# Patient Record
Sex: MALE | Race: BLACK OR AFRICAN AMERICAN | NOT HISPANIC OR LATINO | Employment: OTHER | URBAN - METROPOLITAN AREA
[De-identification: names, ages, dates, MRNs, and addresses within clinical notes are randomized per-mention and may not be internally consistent; named-entity substitution may affect disease eponyms.]

---

## 2019-06-05 ENCOUNTER — LAB VISIT (OUTPATIENT)
Dept: LAB | Facility: HOSPITAL | Age: 74
End: 2019-06-05
Payer: MEDICARE

## 2019-06-05 ENCOUNTER — OFFICE VISIT (OUTPATIENT)
Dept: NEUROLOGY | Facility: CLINIC | Age: 74
End: 2019-06-05
Payer: MEDICARE

## 2019-06-05 VITALS
SYSTOLIC BLOOD PRESSURE: 123 MMHG | WEIGHT: 180.31 LBS | BODY MASS INDEX: 28.98 KG/M2 | HEART RATE: 62 BPM | DIASTOLIC BLOOD PRESSURE: 72 MMHG | HEIGHT: 66 IN

## 2019-06-05 DIAGNOSIS — R20.2 PARESTHESIA: ICD-10-CM

## 2019-06-05 DIAGNOSIS — M54.2 NECK PAIN: ICD-10-CM

## 2019-06-05 PROBLEM — R20.0 NUMBNESS: Status: ACTIVE | Noted: 2019-06-05

## 2019-06-05 PROBLEM — R20.0 NUMBNESS: Status: RESOLVED | Noted: 2019-06-05 | Resolved: 2019-06-05

## 2019-06-05 LAB
CREAT SERPL-MCNC: 1 MG/DL (ref 0.5–1.4)
EST. GFR  (AFRICAN AMERICAN): >60 ML/MIN/1.73 M^2
EST. GFR  (NON AFRICAN AMERICAN): >60 ML/MIN/1.73 M^2

## 2019-06-05 PROCEDURE — 99204 OFFICE O/P NEW MOD 45 MIN: CPT | Mod: S$PBB,GC,, | Performed by: STUDENT IN AN ORGANIZED HEALTH CARE EDUCATION/TRAINING PROGRAM

## 2019-06-05 PROCEDURE — 36415 COLL VENOUS BLD VENIPUNCTURE: CPT

## 2019-06-05 PROCEDURE — 99203 OFFICE O/P NEW LOW 30 MIN: CPT | Mod: PBBFAC | Performed by: STUDENT IN AN ORGANIZED HEALTH CARE EDUCATION/TRAINING PROGRAM

## 2019-06-05 PROCEDURE — 99999 PR PBB SHADOW E&M-NEW PATIENT-LVL III: CPT | Mod: PBBFAC,GC,, | Performed by: STUDENT IN AN ORGANIZED HEALTH CARE EDUCATION/TRAINING PROGRAM

## 2019-06-05 PROCEDURE — 99999 PR PBB SHADOW E&M-NEW PATIENT-LVL III: ICD-10-PCS | Mod: PBBFAC,GC,, | Performed by: STUDENT IN AN ORGANIZED HEALTH CARE EDUCATION/TRAINING PROGRAM

## 2019-06-05 PROCEDURE — 82565 ASSAY OF CREATININE: CPT

## 2019-06-05 PROCEDURE — 99204 PR OFFICE/OUTPT VISIT, NEW, LEVL IV, 45-59 MIN: ICD-10-PCS | Mod: S$PBB,GC,, | Performed by: STUDENT IN AN ORGANIZED HEALTH CARE EDUCATION/TRAINING PROGRAM

## 2019-06-05 RX ORDER — AMLODIPINE BESYLATE 5 MG/1
5 TABLET ORAL
COMMUNITY
End: 2019-07-15 | Stop reason: DRUGHIGH

## 2019-06-05 RX ORDER — TADALAFIL 20 MG/1
20 TABLET ORAL
COMMUNITY

## 2019-06-05 NOTE — ASSESSMENT & PLAN NOTE
No complaint of neck pain, however tenderness to palpation is present on exam  Likely due to DJD in cervical spine and could be the etiology to current symptoms.  Please see below for details

## 2019-06-05 NOTE — ASSESSMENT & PLAN NOTE
Symptoms of hypoesthesia/paresthesia is restricted to the C4-C5 dermatome on exam. No rash noted, however rash related to the shingles can appear after the sensory symptoms. DJD in cervical spine and radiculopathy is the most likely explanation for his symptoms.     - advised on monitoring the skin for rash and if positive alarm his PCP  - will obtain imaging of cervical spine to rule out compression root fibers  - explained that will call him in case of abnormal results  - if no abnormality on MRI, will follow up in clinic in 3 months

## 2019-06-05 NOTE — PROGRESS NOTES
Neurology Clinic  Initial Consult Visit    Patient Name: Wellington Reyna  MRN: [id number]    CC: numbness    HPI: Wellington Reyna is a 73 y.o.  male with no significant past medical Hx, presenting to the clinic today for evaluation of pain and numbness in the right shoulder.      Patient reports new-onset pain over the R shoulder x2 weeks assoiated with numbness. He denies any trauma, inciting events, rash or similar symptoms in the past. He does not report weakness in the shoulder or R arm. Pain is deep, not sharp, has been stable, comes and goes, and is worse at nights. Numbness however, has been persistent. Patient is originally from [place] and used to work as a . He denies smoking or recreational drugs. He used to drink 1-2 glasses of wine and sometimes scotch, however quit drinking alcohol 3 months ago, because he was told by his PCP that one of his lab results was abnormal. He does not take any medications except an occasional multivitamin      Review of Systems:  General: fevers -, chills -, fatigue -, change in appetite -  Eyes: blurred vision -, double vision -, photosensitivity -  ENT: hearing loss -, difficulty swallowing -, drooling -, change in voice -  Respiratory: SOB -, cough -,  choking -  CV: chest pain -, palpitations -  GI: nausea -, vomiting -, abdominal pain -  Urinary: dysuria -, hematuria -, frequency -, urine incontinence  Skin: rash -, change of color -  Neurological: Headache -, dizziness -, weakness -, numbness +, seizure -, gait problem -, difficulty in speech -  Psychiatric: hallucinations -, confusion -, dysphoric mood -, anxiety -      Past Medical History  History reviewed. No pertinent past medical history.    Medications    Current Outpatient Medications:     amLODIPine (NORVASC) 5 MG tablet, 5 mg., Disp: , Rfl:     tadalafil (CIALIS) 20 MG Tab, 20 mg., Disp: , Rfl:   Any other notable medications as documented in HPI    Allergies  Review of patient's  "allergies indicates:  No Known Allergies    Social History  Social History     Socioeconomic History    Marital status:      Spouse name: Not on file    Number of children: Not on file    Years of education: Not on file    Highest education level: Not on file   Occupational History    Not on file   Social Needs    Financial resource strain: Not on file    Food insecurity:     Worry: Not on file     Inability: Not on file    Transportation needs:     Medical: Not on file     Non-medical: Not on file   Tobacco Use    Smoking status: Never Smoker    Smokeless tobacco: Never Used   Substance and Sexual Activity    Alcohol use: Not on file    Drug use: Not on file    Sexual activity: Not on file   Lifestyle    Physical activity:     Days per week: Not on file     Minutes per session: Not on file    Stress: Not on file   Relationships    Social connections:     Talks on phone: Not on file     Gets together: Not on file     Attends Zoroastrian service: Not on file     Active member of club or organization: Not on file     Attends meetings of clubs or organizations: Not on file     Relationship status: Not on file   Other Topics Concern    Not on file   Social History Narrative    Not on file     Any other notable Social History as documented in HPI.    Family History  History reviewed. No pertinent family history.  Any other notable FMH as documented in HPI.    Physical Exam  /72   Pulse 62   Ht 5' 6" (1.676 m)   Wt 81.8 kg (180 lb 5.4 oz)   BMI 29.11 kg/m²     General: Well-developed, well-groomed. No apparent distress  HENT: Normocephalic, atraumatic. No carotid bruits auscultated.   Cardiovascular: Regular rate and rhythm with no murmurs, rubs or gallops.    Chest: Lungs clear to auscultation bilaterally.  No wheezes, stridor, ronchi appreciated.  Abdomen: Normoactive bowel sounds present.  Soft, nontender to palpation.  Musculoskeletal: No peripheral edema, No joint swelling  Skin: no " rash within the associated dermatome    Neurologic Exam:   Mental status and Cognition:  Awake, alert and oriented x3  Follows commands, answers questions appropriately  Short term memory intact  Praxis normal  Speech Normal. Language is fluent.    Fund of knowledge is appropriate.     Cranial nerves:   PERRLA. EOM intact. No Nystagmus. No ophthalmoplegia.   Visual fields are full to confrontation.    Facial sensation is normal to light touch.   Facial expression is full and symmetric.   Hearing is intact bilaterally.   Palate elevates symmetrically.   SCM and Trapezius full strength bilaterally.   Tongue is midline.     Motor examination   normal bulk and tone in all four limbs. There are no atrophy or fasciculations.   Strength is 5/5 in the upper and lower extremities bilaterally.    Sensory examination  Sensation to light touch: intact in BUE and BLE, except on R C4-C5 dermatome (over the neck shoulder junction posteriorly)  Sensation to temperature: intact in BUE and BLE  Sensation to vibration: slightly decreased on toes  Sensation to pinprick: intact in BUE and BLE, except hypersensitive over the associated dermatome    Deep tendon reflexes   1+ and symmetric in the upper and lower extremities bilaterally.    No clonus. Babinski normal bilaterally.    Gait and Coordination:  Normal gait.  Normal tandem walking.  Finger to nose and heel to shin testing is normal bilaterally.      Lab and Test Results    No results found for: WBC, HGB, HCT, PLT  Creatinine   Date Value Ref Range Status   06/05/2019 1.0 0.5 - 1.4 mg/dL Final     No results found for: ALB, ALKPHOS, ALT, AST      Images:  none      Assessment and Plan    Problem List Items Addressed This Visit        Orthopedic    Neck pain    Current Assessment & Plan     No complaint of neck pain, however tenderness to palpation is present on exam  Likely due to DJD in cervical spine and could be the etiology to current symptoms.  Please see below for  details           Relevant Orders    MRI Cervical Spine W WO Cont    Creatinine, serum (Completed)       Other    Paresthesia    Overview     2 weeks of pain in right shoulder with sensory loss         Current Assessment & Plan     Symptoms of hypoesthesia/paresthesia is restricted to the C4-C5 dermatome on exam. No rash noted, however rash related to the shingles can appear after the sensory symptoms. DJD in cervical spine and radiculopathy is the most likely explanation for his symptoms.     - advised on monitoring the skin for rash and if positive alarm his PCP  - will obtain imaging of cervical spine to rule out compression root fibers  - explained that will call him in case of abnormal results  - if no abnormality on MRI, will follow up in clinic in 3 months         Relevant Orders    MRI Cervical Spine W WO Cont              Evelina Izaguirre MD  PGY-II, Neurology Resident  [facility]  [address] [place] [address]

## 2019-06-05 NOTE — PROGRESS NOTES
See resident note.  We saw the patient together, I examined individually, and we discussed the assessment and plan as she documented in her note.

## 2019-06-07 ENCOUNTER — TELEPHONE (OUTPATIENT)
Dept: NEUROLOGY | Facility: CLINIC | Age: 74
End: 2019-06-07

## 2019-06-07 NOTE — TELEPHONE ENCOUNTER
----- Message from Sohail Eddy sent at 6/7/2019 12:29 PM CDT -----  Rx Refill/Request     Is this a Refill or New Rx: New   Rx Name and Strength: pt is asking for medication due to experiencing shoulder pain  Preferred Pharmacy with phone number: see below  Communication Preference: [phone]  Additional Information:     [id number] - [place], [place] - [address]  [address] [place] [id number]  Phone: [phone] Fax: [phone]

## 2019-06-26 ENCOUNTER — HOSPITAL ENCOUNTER (OUTPATIENT)
Dept: RADIOLOGY | Facility: HOSPITAL | Age: 74
Discharge: HOME | End: 2019-06-26
Attending: STUDENT IN AN ORGANIZED HEALTH CARE EDUCATION/TRAINING PROGRAM
Payer: MEDICARE

## 2019-06-26 DIAGNOSIS — M54.2 NECK PAIN: ICD-10-CM

## 2019-06-26 DIAGNOSIS — R20.2 PARESTHESIA: ICD-10-CM

## 2019-06-26 PROCEDURE — 72141 MRI CERVICAL SPINE WITHOUT CONTRAST: ICD-10-PCS | Mod: 26,,, | Performed by: RADIOLOGY

## 2019-06-26 PROCEDURE — 72141 MRI NECK SPINE W/O DYE: CPT | Mod: 26,,, | Performed by: RADIOLOGY

## 2019-06-26 PROCEDURE — 72141 MRI NECK SPINE W/O DYE: CPT | Mod: TC

## 2019-07-15 ENCOUNTER — OFFICE VISIT (OUTPATIENT)
Dept: NEUROLOGY | Facility: CLINIC | Age: 74
End: 2019-07-15
Payer: MEDICARE

## 2019-07-15 VITALS — HEIGHT: 66 IN | BODY MASS INDEX: 28.56 KG/M2 | WEIGHT: 177.69 LBS

## 2019-07-15 DIAGNOSIS — M54.2 NECK PAIN: Primary | ICD-10-CM

## 2019-07-15 DIAGNOSIS — R20.2 PARESTHESIA: ICD-10-CM

## 2019-07-15 DIAGNOSIS — R29.898 WEAKNESS OF RIGHT UPPER EXTREMITY: ICD-10-CM

## 2019-07-15 DIAGNOSIS — M49.82 SPONDYLOPATHY IN DISEASES CLASSIFIED ELSEWHERE, CERVICAL REGION: ICD-10-CM

## 2019-07-15 DIAGNOSIS — M65.331 TRIGGER MIDDLE FINGER OF RIGHT HAND: ICD-10-CM

## 2019-07-15 DIAGNOSIS — M48.02 CERVICAL STENOSIS OF SPINAL CANAL: ICD-10-CM

## 2019-07-15 PROCEDURE — 99215 PR OFFICE/OUTPT VISIT, EST, LEVL V, 40-54 MIN: ICD-10-PCS | Mod: S$PBB,GC,, | Performed by: STUDENT IN AN ORGANIZED HEALTH CARE EDUCATION/TRAINING PROGRAM

## 2019-07-15 PROCEDURE — 99215 OFFICE O/P EST HI 40 MIN: CPT | Mod: S$PBB,GC,, | Performed by: STUDENT IN AN ORGANIZED HEALTH CARE EDUCATION/TRAINING PROGRAM

## 2019-07-15 PROCEDURE — 99999 PR PBB SHADOW E&M-EST. PATIENT-LVL III: CPT | Mod: PBBFAC,GC,, | Performed by: STUDENT IN AN ORGANIZED HEALTH CARE EDUCATION/TRAINING PROGRAM

## 2019-07-15 PROCEDURE — 99999 PR PBB SHADOW E&M-EST. PATIENT-LVL III: ICD-10-PCS | Mod: PBBFAC,GC,, | Performed by: STUDENT IN AN ORGANIZED HEALTH CARE EDUCATION/TRAINING PROGRAM

## 2019-07-15 PROCEDURE — 99213 OFFICE O/P EST LOW 20 MIN: CPT | Mod: PBBFAC | Performed by: STUDENT IN AN ORGANIZED HEALTH CARE EDUCATION/TRAINING PROGRAM

## 2019-07-15 RX ORDER — AMLODIPINE BESYLATE 2.5 MG/1
2.5 TABLET ORAL DAILY
Refills: 6 | COMMUNITY
Start: 2019-07-10

## 2019-07-15 NOTE — PROGRESS NOTES
Neurology Clinic  Follow up Visit    Patient Name: Wellington Reyna  MRN: [id number]    CC: numbness    HPI: Wellington Reyna is a 73 y.o. RH male with no significant past medical Hx, presenting to the clinic today for follow up on pain and numbness in the right shoulder.    Interval Hx:  Patient continues to complain of numbness over the R should/neck area. Pain has subsided but he has noted some weakness in R arm. MRI C-spine that was obtained after his first visit was significant for multilevel DJD, canal stenosis as well as foraminal narrowing.   Patient also c/o of locking the right middle finger upon opening the fist.      Initial encounter (6/5/19):  Patient reports new-onset pain over the R shoulder x2 weeks assoiated with numbness. He denies any trauma, inciting events, rash or similar symptoms in the past. He does not report weakness in the shoulder or R arm. Pain is deep, not sharp, has been stable, comes and goes, and is worse at nights. Numbness however, has been persistent. Patient is originally from [place] and used to work as a . He denies smoking or recreational drugs. He used to drink 1-2 glasses of wine and sometimes scotch, however quit drinking alcohol 3 months ago, because he was told by his PCP that one of his lab results was abnormal. He does not take any medications except an occasional multivitamin      Review of Systems:  General: fevers -, chills -, fatigue -, change in appetite -  Eyes: blurred vision -, double vision -, photosensitivity -  ENT: hearing loss -, difficulty swallowing -, drooling -, change in voice -  Respiratory: SOB -, cough -,  choking -  CV: chest pain -, palpitations -  GI: nausea -, vomiting -, abdominal pain -  Urinary: dysuria -, hematuria -, frequency -, urine incontinence  Skin: rash -, change of color -  Neurological: Headache -, dizziness -, weakness +, numbness +, seizure -, gait problem -, difficulty in speech -  Psychiatric: hallucinations  "-, confusion -, dysphoric mood -, anxiety -      Past Medical History  No past medical history on file.    Medications    Current Outpatient Medications:     amLODIPine (NORVASC) 2.5 MG tablet, Take 2.5 mg by mouth once daily., Disp: , Rfl: 6    tadalafil (CIALIS) 20 MG Tab, 20 mg., Disp: , Rfl:   Any other notable medications as documented in HPI    Allergies  Review of patient's allergies indicates:  No Known Allergies    Social History  Social History     Socioeconomic History    Marital status:      Spouse name: Not on file    Number of children: Not on file    Years of education: Not on file    Highest education level: Not on file   Occupational History    Not on file   Social Needs    Financial resource strain: Not on file    Food insecurity:     Worry: Not on file     Inability: Not on file    Transportation needs:     Medical: Not on file     Non-medical: Not on file   Tobacco Use    Smoking status: Never Smoker    Smokeless tobacco: Never Used   Substance and Sexual Activity    Alcohol use: Not on file    Drug use: Not on file    Sexual activity: Not on file   Lifestyle    Physical activity:     Days per week: Not on file     Minutes per session: Not on file    Stress: Not on file   Relationships    Social connections:     Talks on phone: Not on file     Gets together: Not on file     Attends Mu-ism service: Not on file     Active member of club or organization: Not on file     Attends meetings of clubs or organizations: Not on file     Relationship status: Not on file   Other Topics Concern    Not on file   Social History Narrative    Not on file     Any other notable Social History as documented in HPI.    Family History  No family history on file.  Any other notable FMH as documented in HPI.    Physical Exam  Ht 5' 6" (1.676 m)   Wt 80.6 kg (177 lb 11.1 oz)   BMI 28.68 kg/m²     General: Well-developed, well-groomed. No apparent distress  HENT: Normocephalic, atraumatic. " No carotid bruits auscultated.   Cardiovascular: Regular rate and rhythm with no murmurs, rubs or gallops.    Musculoskeletal: No peripheral edema, No joint swelling.   Locking of DIP/PIP upon extention in R 3rd digit. No TTP.  Skin: no rash within the associated dermatome    Neurologic Exam:   Mental status and Cognition:  Awake, alert and oriented x3  Follows commands, answers questions appropriately  Short term memory intact  Praxis normal  Speech Normal. Language is fluent.    Fund of knowledge is appropriate.     Cranial nerves:   PERRLA. EOM intact. No Nystagmus. No ophthalmoplegia.   Visual fields are full to confrontation.    Facial sensation is normal to light touch.   Facial expression is full and symmetric.   Hearing is intact bilaterally.   Palate elevates symmetrically.   SCM and Trapezius full strength bilaterally.   Tongue is midline.     Motor examination   normal bulk and tone in all four limbs. There are no atrophy or fasciculations.   Strength is 5/5 throughout except proximal RUE 4+/5    Sensory examination  Sensation to light touch: intact in BUE and BLE, except on R C5-C6 dermatome (over the neck shoulder junction posteriorly)  Sensation to temperature: intact in BUE and BLE  Sensation to vibration: slightly decreased on toes  Sensation to pinprick: intact in BUE and BLE, except hypersensitive over the associated dermatome    Deep tendon reflexes   1+ and symmetric in the upper and lower extremities bilaterally.    No clonus. Babinski normal bilaterally.    Gait and Coordination:  Normal gait.  Normal tandem walking.  Finger to nose and heel to shin testing is normal bilaterally.      Lab and Test Results    No results found for: WBC, HGB, HCT, PLT  Creatinine   Date Value Ref Range Status   06/05/2019 1.0 0.5 - 1.4 mg/dL Final     No results found for: ALB, ALKPHOS, ALT, AST      Images:  MRI C-spine (6/5/19):  Multilevel degenerative changes of the cervical spine most pronounced at the levels  of C3-C4 through C6-C7 with associated bilateral moderate to severe neural foraminal narrowing      Assessment and Plan    Problem List Items Addressed This Visit        Neuro    Cervical stenosis of spinal canal    Current Assessment & Plan     Symptoms/signs of hypoesthesia/paresthesia in C5-C6 dermatome on exam. Symptoms now progressing to weakness in RUE. Multilevel DJD, canal stenosis and foraminal narrowing in the MRI of C-spine     - will refer to back and spine clinic for further evaluation (and considering surgical intervention)  - will refer to PT to prevent progression of weakness in the meanwhile  - no need for medication since paresthetic pain has subsided  - follow up in neurology clinic as needed         Relevant Orders    Ambulatory Referral to Back & Spine Clinic    Spondylopathy in diseases classified elsewhere, cervical region    Relevant Orders    Ambulatory Referral to Back & Spine Clinic    Ambulatory consult to Physical Therapy       Orthopedic    Neck pain - Primary    Current Assessment & Plan     No complaint of neck pain, however tenderness to palpation is present on exam  secondary to DJD in cervical spine  Please see below for details           Relevant Orders    Ambulatory Referral to Back & Spine Clinic    Weakness of right upper extremity    Current Assessment & Plan     Concerning for progression of radiculopathy  Please see above for management and plan         Relevant Orders    Ambulatory Referral to Back & Spine Clinic    Ambulatory consult to Physical Therapy    Trigger middle finger of right hand    Current Assessment & Plan     Patient with complaint and exam consistent with trigger finger (R 3rd digit)    - will refer to hand surgery clinic for evaluation (injection vs tendon release)         Relevant Orders    Ambulatory referral to Hand Surgery       Other    Paresthesia    Overview     2 weeks of pain in right shoulder with sensory loss         Relevant Orders     Ambulatory Referral to Back & Spine Clinic              Evelina Izaguirre MD  PGY-III, Neurology Resident  [facility]  [address] [place] [address]

## 2019-07-18 ENCOUNTER — TELEPHONE (OUTPATIENT)
Dept: SPINE | Facility: CLINIC | Age: 74
End: 2019-07-18

## 2019-07-18 DIAGNOSIS — M54.2 CERVICALGIA: Primary | ICD-10-CM

## 2019-07-19 NOTE — ASSESSMENT & PLAN NOTE
Patient with complaint and exam consistent with trigger finger (R 3rd digit)    - will refer to hand surgery clinic for evaluation (injection vs tendon release)

## 2019-07-19 NOTE — ASSESSMENT & PLAN NOTE
Symptoms/signs of hypoesthesia/paresthesia in C5-C6 dermatome on exam. Symptoms now progressing to weakness in RUE. Multilevel DJD, canal stenosis and foraminal narrowing in the MRI of C-spine     - will refer to back and spine clinic for further evaluation (and considering surgical intervention)  - will refer to PT to prevent progression of weakness in the meanwhile  - no need for medication since paresthetic pain has subsided  - follow up in neurology clinic as needed

## 2019-07-19 NOTE — ASSESSMENT & PLAN NOTE
No complaint of neck pain, however tenderness to palpation is present on exam  secondary to DJD in cervical spine  Please see below for details

## 2019-08-02 DIAGNOSIS — R52 PAIN: Primary | ICD-10-CM

## 2019-08-06 ENCOUNTER — TELEPHONE (OUTPATIENT)
Dept: ORTHOPEDICS | Facility: CLINIC | Age: 74
End: 2019-08-06

## 2019-08-07 ENCOUNTER — OFFICE VISIT (OUTPATIENT)
Dept: ORTHOPEDICS | Facility: CLINIC | Age: 74
End: 2019-08-07
Payer: MEDICARE

## 2019-08-07 ENCOUNTER — HOSPITAL ENCOUNTER (OUTPATIENT)
Dept: RADIOLOGY | Facility: OTHER | Age: 74
Discharge: HOME | End: 2019-08-07
Attending: PLASTIC SURGERY
Payer: MEDICARE

## 2019-08-07 ENCOUNTER — HOSPITAL ENCOUNTER (OUTPATIENT)
Dept: RADIOLOGY | Facility: OTHER | Age: 74
Discharge: HOME | End: 2019-08-07
Attending: PHYSICIAN ASSISTANT
Payer: MEDICARE

## 2019-08-07 ENCOUNTER — OFFICE VISIT (OUTPATIENT)
Dept: SPINE | Facility: CLINIC | Age: 74
End: 2019-08-07
Payer: MEDICARE

## 2019-08-07 VITALS
DIASTOLIC BLOOD PRESSURE: 69 MMHG | SYSTOLIC BLOOD PRESSURE: 129 MMHG | TEMPERATURE: 98 F | BODY MASS INDEX: 28.45 KG/M2 | BODY MASS INDEX: 28.56 KG/M2 | DIASTOLIC BLOOD PRESSURE: 67 MMHG | SYSTOLIC BLOOD PRESSURE: 119 MMHG | HEART RATE: 60 BPM | WEIGHT: 177 LBS | HEIGHT: 66 IN | HEIGHT: 66 IN | WEIGHT: 177.69 LBS | HEART RATE: 59 BPM

## 2019-08-07 DIAGNOSIS — M65.331 ACQUIRED TRIGGER FINGER OF RIGHT MIDDLE FINGER: Primary | ICD-10-CM

## 2019-08-07 DIAGNOSIS — M54.2 CERVICALGIA: ICD-10-CM

## 2019-08-07 DIAGNOSIS — M47.812 CERVICAL SPONDYLOSIS WITHOUT MYELOPATHY: Primary | ICD-10-CM

## 2019-08-07 DIAGNOSIS — M43.12 SPONDYLOLISTHESIS OF CERVICAL REGION: ICD-10-CM

## 2019-08-07 DIAGNOSIS — M50.30 DDD (DEGENERATIVE DISC DISEASE), CERVICAL: ICD-10-CM

## 2019-08-07 DIAGNOSIS — R29.898 RIGHT ARM WEAKNESS: ICD-10-CM

## 2019-08-07 DIAGNOSIS — R52 PAIN: ICD-10-CM

## 2019-08-07 PROCEDURE — 99213 OFFICE O/P EST LOW 20 MIN: CPT | Mod: PBBFAC,25 | Performed by: PLASTIC SURGERY

## 2019-08-07 PROCEDURE — 20550 NJX 1 TENDON SHEATH/LIGAMENT: CPT | Mod: S$PBB,F7,, | Performed by: PLASTIC SURGERY

## 2019-08-07 PROCEDURE — 73130 X-RAY EXAM OF HAND: CPT | Mod: 26,RT,, | Performed by: RADIOLOGY

## 2019-08-07 PROCEDURE — 99214 OFFICE O/P EST MOD 30 MIN: CPT | Mod: PBBFAC,25,27 | Performed by: PHYSICIAN ASSISTANT

## 2019-08-07 PROCEDURE — 72050 XR CERVICAL SPINE AP LAT WITH FLEX EXTEN: ICD-10-PCS | Mod: 26,,, | Performed by: RADIOLOGY

## 2019-08-07 PROCEDURE — 73130 X-RAY EXAM OF HAND: CPT | Mod: TC,FY,RT

## 2019-08-07 PROCEDURE — 99999 PR PBB SHADOW E&M-EST. PATIENT-LVL III: CPT | Mod: PBBFAC,,, | Performed by: PLASTIC SURGERY

## 2019-08-07 PROCEDURE — 99999 PR PBB SHADOW E&M-EST. PATIENT-LVL IV: ICD-10-PCS | Mod: PBBFAC,,, | Performed by: PHYSICIAN ASSISTANT

## 2019-08-07 PROCEDURE — 73130 XR HAND COMPLETE 3 VIEW RIGHT: ICD-10-PCS | Mod: 26,RT,, | Performed by: RADIOLOGY

## 2019-08-07 PROCEDURE — 99203 PR OFFICE/OUTPT VISIT, NEW, LEVL III, 30-44 MIN: ICD-10-PCS | Mod: 25,S$PBB,, | Performed by: PLASTIC SURGERY

## 2019-08-07 PROCEDURE — 20550 NJX 1 TENDON SHEATH/LIGAMENT: CPT | Mod: PBBFAC | Performed by: PLASTIC SURGERY

## 2019-08-07 PROCEDURE — 20550 PR INJECT TENDON SHEATH/LIGAMENT: ICD-10-PCS | Mod: S$PBB,F7,, | Performed by: PLASTIC SURGERY

## 2019-08-07 PROCEDURE — 99204 OFFICE O/P NEW MOD 45 MIN: CPT | Mod: S$PBB,,, | Performed by: PHYSICIAN ASSISTANT

## 2019-08-07 PROCEDURE — 72050 X-RAY EXAM NECK SPINE 4/5VWS: CPT | Mod: TC,FY

## 2019-08-07 PROCEDURE — 99204 PR OFFICE/OUTPT VISIT, NEW, LEVL IV, 45-59 MIN: ICD-10-PCS | Mod: S$PBB,,, | Performed by: PHYSICIAN ASSISTANT

## 2019-08-07 PROCEDURE — 99999 PR PBB SHADOW E&M-EST. PATIENT-LVL III: ICD-10-PCS | Mod: PBBFAC,,, | Performed by: PLASTIC SURGERY

## 2019-08-07 PROCEDURE — 99999 PR PBB SHADOW E&M-EST. PATIENT-LVL IV: CPT | Mod: PBBFAC,,, | Performed by: PHYSICIAN ASSISTANT

## 2019-08-07 PROCEDURE — 99203 OFFICE O/P NEW LOW 30 MIN: CPT | Mod: 25,S$PBB,, | Performed by: PLASTIC SURGERY

## 2019-08-07 PROCEDURE — 72050 X-RAY EXAM NECK SPINE 4/5VWS: CPT | Mod: 26,,, | Performed by: RADIOLOGY

## 2019-08-07 RX ORDER — GABAPENTIN 300 MG/1
CAPSULE ORAL
Qty: 90 CAPSULE | Refills: 2 | Status: SHIPPED | OUTPATIENT
Start: 2019-08-07 | End: 2019-11-01 | Stop reason: SDUPTHER

## 2019-08-07 RX ORDER — TRIAMCINOLONE ACETONIDE 40 MG/ML
40 INJECTION, SUSPENSION INTRA-ARTICULAR; INTRAMUSCULAR
Status: COMPLETED | OUTPATIENT
Start: 2019-08-07 | End: 2019-08-07

## 2019-08-07 RX ADMIN — TRIAMCINOLONE ACETONIDE 40 MG: 40 INJECTION, SUSPENSION INTRA-ARTICULAR; INTRAMUSCULAR at 10:08

## 2019-08-07 NOTE — PROGRESS NOTES
Subjective:     Patient ID:  Wellington Reyna is a 74 y.o. male.    [facility]    Chief Complaint: Neck numbness and right arm weakness    HPI    Wellington Reyna is a 74 y.o. male who presents with the above CC.  A few months ago he started to have right sided neck pain that lasted for a few weeks and then has persisted with numbness in the right neck to the shoulder region some pain in the elbow and right arm weakness.  No arm pain or paresthesias.  No current neck pain.    Patient has not had PT or ESIs for the neck.  He has had this for the low back in the past.  No spine surgery.  Patient is currently taking tylenol OTC.    Patient denies any recent accidents or trauma, no saddle anesthesias, and no bowel or bladder incontinence.    Patient denies any difficulty with balance or gait, no difficulty tying shoes or buttoning clothes, is not dropping things, does not have difficulty opening containers, and has had no change in handwriting with the left hand.  Some problems with the right hand because of right arm and hand weakness.      Review of Systems:    Review of Systems   Constitutional: Negative for chills, diaphoresis, fever, malaise/fatigue and weight loss.   HENT: Negative for congestion, ear discharge, ear pain, hearing loss, nosebleeds, sinus pain, sore throat and tinnitus.    Eyes: Negative for blurred vision, double vision, photophobia, pain, discharge and redness.   Respiratory: Negative for cough, hemoptysis, sputum production, shortness of breath, wheezing and stridor.    Cardiovascular: Negative for chest pain, palpitations, orthopnea, leg swelling and PND.   Gastrointestinal: Negative for abdominal pain, blood in stool, constipation, diarrhea, heartburn, melena, nausea and vomiting.   Genitourinary: Negative for dysuria, flank pain, frequency, hematuria and urgency.   Musculoskeletal: Positive for back pain, joint pain and neck pain. Negative for falls and myalgias.   Skin: Negative for itching  and rash.   Neurological: Positive for dizziness and tingling. Negative for tremors, sensory change, speech change, seizures, loss of consciousness, weakness and headaches.   Endo/Heme/Allergies: Negative for environmental allergies and polydipsia. Does not bruise/bleed easily.   Psychiatric/Behavioral: Negative for depression, hallucinations, memory loss and substance abuse. The patient is not nervous/anxious and does not have insomnia.        History reviewed. No pertinent past medical history.  History reviewed. No pertinent surgical history.  Current Outpatient Medications on File Prior to Visit   Medication Sig Dispense Refill    amLODIPine (NORVASC) 2.5 MG tablet Take 2.5 mg by mouth once daily.  6    tadalafil (CIALIS) 20 MG Tab 20 mg.       No current facility-administered medications on file prior to visit.      Review of patient's allergies indicates:  No Known Allergies  Social History     Socioeconomic History    Marital status:      Spouse name: Not on file    Number of children: Not on file    Years of education: Not on file    Highest education level: Not on file   Occupational History    Not on file   Social Needs    Financial resource strain: Not on file    Food insecurity:     Worry: Not on file     Inability: Not on file    Transportation needs:     Medical: Not on file     Non-medical: Not on file   Tobacco Use    Smoking status: Never Smoker    Smokeless tobacco: Never Used   Substance and Sexual Activity    Alcohol use: Not on file    Drug use: Not on file    Sexual activity: Not on file   Lifestyle    Physical activity:     Days per week: Not on file     Minutes per session: Not on file    Stress: Not on file   Relationships    Social connections:     Talks on phone: Not on file     Gets together: Not on file     Attends Congregation service: Not on file     Active member of club or organization: Not on file     Attends meetings of clubs or organizations: Not on file      "Relationship status: Not on file   Other Topics Concern    Not on file   Social History Narrative    Not on file     History reviewed. No pertinent family history.    Objective:      Vitals:    08/07/19 1028   BP: 119/67   Pulse: (!) 59   Temp: 97.9 °F (36.6 °C)   Weight: 80.3 kg (177 lb)   Height: 5' 6" (1.676 m)   PainSc:   6   PainLoc: Neck         Physical Exam:    General:  Wellington Reyna is well-developed, well-nourished, appears stated age, in no acute distress, alert and oriented to person, place, and time.    Pulmonary/Chest:  Respiratory effort normal  Abdominal: Exhibits no distension  Psychiatric:  Normal mood and affect.  Behavior is normal.  Judgement and thought content normal    Musculoskeletal:    Patient arises from a sitting to standing position without difficulty.  Patient walks to the door without evidence of limp, pain, or abnormality of gait. Patient is able to walk heel to toe without difficulty.    Cervical ROM:   No pain in cervical spine flexion, extension, right lateral bending, left lateral bending, right rotation, and left rotation just stiffness.    Cervical Spine Inspection:  Normal with no surgical scars with no visible rashes.    Cervical Spine Palpation:  No tenderness to cervical spine palpation.    Neurological: Alert and oriented to person, place, and time    Muscle strength against resistance:     Right Left   Deltoid  5 / 5 5 / 5   Biceps  4+ / 5 5 / 5   Triceps 4 / 5 5 / 5   Wrist flexion  5 / 5 5 / 5   Wrist extension 5 / 5 5 / 5   Finger abduction 5 / 5 5 / 5   Thumb opposition 5 / 5 5 / 5   Handgrip 4 / 5 4 / 5   Hip flexion  5 / 5 5 / 5   Hip extension 5 / 5 5 / 5   Hip abduction 5 / 5 5 / 5   Hip adduction 5/ 5 5 / 5   Knee extension  5 / 5 5 / 5   Knee flexion  5 / 5 5 / 5   Dorsiflexion  5 / 5 5 / 5   EHL  5 / 5 5 / 5   Plantar flexion  5 / 5 5 / 5   Inversion of the feet 5 / 5 5 / 5   Eversion of the feet 5 / 5 5 / 5     Reflexes:     Right Left   Triceps 2+ 2+ "   Biceps 2+ 2+   Brachioradialis 2+ 2+   Patellar 2+ 2+   Achilles 2+ 2+     Babinski: Negative bilaterally  Clonus:  Negative bilaterally  Dickerson: Negative bilaterally  Negative lhermittes sign    On gross examination of the bilateral lower extremities, patient has full painfree ROM with no signs of clubbing, cyanosis, edema, and weakness.     XRAY/MRI Interpretation:     Cervical spine ap/lateral/flexion/extension xrays were personally reviewed today.  No fractures.  No movement on flexion and extension.  DDD and spondylosis from C3-4 to C5-6.  Retrolisthesis at C4-5 and C5-6 and anterolisthesis at C6-7.    Cervical spine MRI was personally reviewed today.  Multilevel DDD and spondylosis.  BBDD at C3-4 with severe NFS bilaterally.  Bilateral NFS at C4-5 worse on the right.  Central HNP at C5-6 with mild bilateral NFS.    Assessment:          1. Cervical spondylosis without myelopathy    2. DDD (degenerative disc disease), cervical    3. Spondylolisthesis of cervical region    4. Right arm weakness            Plan:          Orders Placed This Encounter    Ambulatory Referral to Physical/Occupational Therapy    gabapentin (NEURONTIN) 300 MG capsule       Multilevel cervical spondylosis with NFS at multiple levels    -PT outside of [facility] for Cspine and mainly for right arm weakness and for traction  -Neurontin  -Fu in three months and will assess right arm again and potentially consider ESIs if needed      Follow-Up:  Follow up in about 3 months (around 11/7/2019). If there are any questions prior to this, the patient was instructed to contact the office.       Gianna Carpio, [facility], PA-C  Neurosurgery  [facility]  [facility]

## 2019-08-07 NOTE — PROGRESS NOTES
Chief Complaint: Pain of the Right Hand      HPI:    Wellington Reyna is a right hand dominant 74 y.o. male presenting today for right middle finger trigger digit. He states that he has been experiencing triggering with flexion extension of the right middle finger for the last several months.  He also complains of tenderness at the base of the middle finger.  He denies any recent history of trauma other than a small fall on outstretched hand approximately 1 month ago.  He did not seek medical attention at that time. He also complains of occasional numbness and tingling within the right upper extremity he has a history of a right carpal tunnel release and de Quervain release many years ago.  He does have a history of cervical stenosis and is currently being evaluated by Neurology.        .No past medical history on file.    No past surgical history on file.     No family history on file.    Social History     Socioeconomic History    Marital status:      Spouse name: Not on file    Number of children: Not on file    Years of education: Not on file    Highest education level: Not on file   Occupational History    Not on file   Social Needs    Financial resource strain: Not on file    Food insecurity:     Worry: Not on file     Inability: Not on file    Transportation needs:     Medical: Not on file     Non-medical: Not on file   Tobacco Use    Smoking status: Never Smoker    Smokeless tobacco: Never Used   Substance and Sexual Activity    Alcohol use: Not on file    Drug use: Not on file    Sexual activity: Not on file   Lifestyle    Physical activity:     Days per week: Not on file     Minutes per session: Not on file    Stress: Not on file   Relationships    Social connections:     Talks on phone: Not on file     Gets together: Not on file     Attends [employer] service: Not on file     Active member of club or organization: Not on file     Attends meetings of clubs or organizations: Not on  "file     Relationship status: Not on file   Other Topics Concern    Not on file   Social History Narrative    Not on file       Review of patient's allergies indicates:  No Known Allergies      Current Outpatient Medications:     amLODIPine (NORVASC) 2.5 MG tablet, Take 2.5 mg by mouth once daily., Disp: , Rfl: 6    tadalafil (CIALIS) 20 MG Tab, 20 mg., Disp: , Rfl:     Current Facility-Administered Medications:     [COMPLETED] triamcinolone acetonide injection 40 mg, 40 mg, Other, 1 time in Clinic/HOD, Jewel Lehman Jr., MD, 40 mg at 08/07/19 1030        Review of Systems:  Constitutional: no fever or chills  ENT: no nasal congestion or sore throat  Respiratory: no cough or shortness of breath  Cardiovascular: no chest pain or palpitations  Gastrointestinal: no nausea or vomiting, PUD, GERD, NSAID intolerance  Genitourinary: no hematuria or dysuria  Integument/Breast: no rash or pruritis  Hematologic/Lymphatic: no easy bruising or lymphadenopathy  Musculoskeletal: see HPI  Neurological: no seizures or tremors  Behavioral/Psych: no auditory or visual hallucinations      Objective:      PHYSICAL EXAM:  Vitals:    08/07/19 1001   BP: 129/69   Pulse: 60   Weight: 80.6 kg (177 lb 11.1 oz)   Height: 5' 6" (1.676 m)   PainSc:   6     General Appearance: WDWN, NAD  Neuro/Psych: Mood & affect appropriate  Lungs: Respirations equal and unlabored.   CV: No apparent CV dysfunction, distal pulses intact, RRR  Skin: Intact throughout  Extremities:   Right Hand Exam     Tenderness   Right hand tenderness location: Tenderness to palpation over the A1 pulley of the right middle finger.    Range of Motion   Hand   MP Middle: normal   PIP Middle: normal   DIP Middle: normal     Tests   Tinel's sign (median nerve): negative    Other   Erythema: absent  Scars: present  Right hand sensation: Decreased sensation in the median distribution compared to the radial and ulnar.  Pulse: present    Comments:  Thenar atrophy with poor " opposition              DIAGNOSTICS/IMAGING:    Radiologist's Impression:     EXAMINATION:  XR HAND COMPLETE 3 VIEW RIGHT    CLINICAL HISTORY:  Pain, unspecified    TECHNIQUE:  PA, lateral, and oblique views of the right hand were performed.    COMPARISON:  None available    FINDINGS:  Mild DJD at the base of the thumb and 1st MCP.  Additional scattered interphalangeal joint space narrowing.  No acute fracture or dislocation.  Joint articulations are maintained.            Comments: I have personnaly reviewed the imaging and I agree with the above radiologist's report.    PROCEDURE:  I have explained the risks, benefits, and alternatives of the procedure in detail.  The patient voices understanding and all questions have been answered. So after I performed a sterile prep of the skin, the level of there A-1 pulley of the right long finger is injected using a 25 gauge needle from the volar approach with a  combination of 1cc 1% plain Lidocaine and 40 mg of Kenalog.  The patient is cautioned and immediate relief of pain is secondary to the local anesthetic and will be temporary.  After the anesthetic wears off there may be a increase in pain that may last for a few hours or a few days and they should use ice to help alleviate this flair up of pain.           Assessment:     Encounter Diagnosis   Name Primary?    Acquired trigger finger of right middle finger Yes              Plan/Discussion:   Wellington was seen today for pain.    Diagnoses and all orders for this visit:    Acquired trigger finger of right middle finger    Other orders  -     triamcinolone acetonide injection 40 mg      Patient presents today with a right middle finger trigger digit.  I discussed the pathophysiology progression treatment of trigger digits with the patient detail he was offered a corticosteroid injection to tendon sheath to help alleviate his symptoms. It also appears that he may have longstanding median nerve compression possibly from a  chronic cervical radiculopathy.  Was suggest future EMG and evaluation by Neurology as he has had a previous carpal tunnel release in the past.  Patient agree with this all questions concerns were addressed prior to discharge

## 2019-08-07 NOTE — LETTER
August 7, 2019      Evelina Izaguirre MD  [address]  [facility] [address]           [facility] [place], operated by [facility] [place] [facility] [address]  [address], [address]  [facility] [address]  Phone: [phone]          Patient: Wellington Reyna   MR Number: [id number]   YOB: 1945   Date of Visit: 8/7/2019       Dear Dr. Evelina Izaguirre:    Thank you for referring Wellington Reyna to me for evaluation. Attached you will find relevant portions of my assessment and plan of care.    If you have questions, please do not hesitate to call me. I look forward to following Wellington Reyna along with you.    Sincerely,    Jewel Lehman Jr., MD    Enclosure  CC:  No Recipients    If you would like to receive this communication electronically, please contact [email or web address] or [phone] to request more information on [email or web address] Link access.    For providers and/or their staff who would like to refer a patient to [facility], please contact us through our one-stop-shop provider referral line, [facility], at [phone].    If you feel you have received this communication in error or would no longer like to receive these types of communications, please e-mail [email or web address]

## 2019-08-07 NOTE — LETTER
August 7, 2019      Evelina Izaguirre MD  [address]  [facility] [address]           [address]  [address], [address]  [facility] [address]  Phone: [phone]  Fax: [phone]          Patient: Wellington Reyna   MR Number: [id number]   YOB: 1945   Date of Visit: 8/7/2019       Dear Dr. Evelina Izaguirre:    Thank you for referring Wellington Reyna to me for evaluation. Attached you will find relevant portions of my assessment and plan of care.    If you have questions, please do not hesitate to call me. I look forward to following Wellington Reyna along with you.    Sincerely,    Gianna Carpio PA-C    Enclosure  CC:  No Recipients    If you would like to receive this communication electronically, please contact [email or web address] or [phone] to request more information on [email or web address] Link access.    For providers and/or their staff who would like to refer a patient to [facility], please contact us through our one-stop-shop provider referral line, [facility], at [phone].    If you feel you have received this communication in error or would no longer like to receive these types of communications, please e-mail [email or web address]

## 2019-11-01 RX ORDER — GABAPENTIN 300 MG/1
300 CAPSULE ORAL 3 TIMES DAILY
Qty: 270 CAPSULE | Refills: 0 | Status: SHIPPED | OUTPATIENT
Start: 2019-11-01

## 2021-04-28 ENCOUNTER — PATIENT MESSAGE (OUTPATIENT)
Dept: RESEARCH | Facility: HOSPITAL | Age: 76
End: 2021-04-28